# Patient Record
Sex: FEMALE | Race: BLACK OR AFRICAN AMERICAN | NOT HISPANIC OR LATINO | Employment: FULL TIME | URBAN - METROPOLITAN AREA
[De-identification: names, ages, dates, MRNs, and addresses within clinical notes are randomized per-mention and may not be internally consistent; named-entity substitution may affect disease eponyms.]

---
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
## 2023-10-16 ENCOUNTER — HOSPITAL ENCOUNTER (EMERGENCY)
Facility: CLINIC | Age: 23
Discharge: HOME | End: 2023-10-17
Attending: EMERGENCY MEDICINE | Admitting: EMERGENCY MEDICINE
Payer: COMMERCIAL

## 2023-10-16 VITALS
BODY MASS INDEX: 23.39 KG/M2 | DIASTOLIC BLOOD PRESSURE: 76 MMHG | TEMPERATURE: 98.2 F | SYSTOLIC BLOOD PRESSURE: 132 MMHG | WEIGHT: 154.3 LBS | RESPIRATION RATE: 18 BRPM | OXYGEN SATURATION: 100 % | HEART RATE: 82 BPM | HEIGHT: 68 IN

## 2023-10-16 DIAGNOSIS — R11.2 NAUSEA AND VOMITING, UNSPECIFIED VOMITING TYPE: ICD-10-CM

## 2023-10-16 DIAGNOSIS — R10.9 ABDOMINAL PAIN, UNSPECIFIED ABDOMINAL LOCATION: ICD-10-CM

## 2023-10-16 LAB
ALBUMIN SERPL BCG-MCNC: 4.4 G/DL (ref 3.5–5.2)
ALBUMIN UR-MCNC: 30 MG/DL
ALP SERPL-CCNC: 43 U/L (ref 35–104)
ALT SERPL W P-5'-P-CCNC: 7 U/L (ref 0–50)
ANION GAP SERPL CALCULATED.3IONS-SCNC: 16 MMOL/L (ref 7–15)
APPEARANCE UR: CLEAR
AST SERPL W P-5'-P-CCNC: 23 U/L (ref 0–45)
BACTERIA #/AREA URNS HPF: ABNORMAL /HPF
BASO+EOS+MONOS # BLD AUTO: NORMAL 10*3/UL
BASO+EOS+MONOS NFR BLD AUTO: NORMAL %
BASOPHILS # BLD AUTO: 0 10E3/UL (ref 0–0.2)
BASOPHILS NFR BLD AUTO: 0 %
BILIRUB SERPL-MCNC: 0.8 MG/DL
BILIRUB UR QL STRIP: NEGATIVE
BUN SERPL-MCNC: 9.6 MG/DL (ref 6–20)
CALCIUM SERPL-MCNC: 9.6 MG/DL (ref 8.6–10)
CHLORIDE SERPL-SCNC: 103 MMOL/L (ref 98–107)
COLOR UR AUTO: ABNORMAL
CREAT SERPL-MCNC: 0.61 MG/DL (ref 0.51–0.95)
DEPRECATED HCO3 PLAS-SCNC: 19 MMOL/L (ref 22–29)
EGFRCR SERPLBLD CKD-EPI 2021: >90 ML/MIN/1.73M2
EOSINOPHIL # BLD AUTO: 0 10E3/UL (ref 0–0.7)
EOSINOPHIL NFR BLD AUTO: 0 %
ERYTHROCYTE [DISTWIDTH] IN BLOOD BY AUTOMATED COUNT: 12.7 % (ref 10–15)
GLUCOSE SERPL-MCNC: 92 MG/DL (ref 70–99)
GLUCOSE UR STRIP-MCNC: NEGATIVE MG/DL
HCG UR QL: NEGATIVE
HCT VFR BLD AUTO: 40.1 % (ref 35–47)
HGB BLD-MCNC: 13.7 G/DL (ref 11.7–15.7)
HGB UR QL STRIP: NEGATIVE
IMM GRANULOCYTES # BLD: 0 10E3/UL
IMM GRANULOCYTES NFR BLD: 0 %
KETONES UR STRIP-MCNC: >150 MG/DL
LEUKOCYTE ESTERASE UR QL STRIP: NEGATIVE
LIPASE SERPL-CCNC: 18 U/L (ref 13–60)
LYMPHOCYTES # BLD AUTO: 2.7 10E3/UL (ref 0.8–5.3)
LYMPHOCYTES NFR BLD AUTO: 42 %
MCH RBC QN AUTO: 31.1 PG (ref 26.5–33)
MCHC RBC AUTO-ENTMCNC: 34.2 G/DL (ref 31.5–36.5)
MCV RBC AUTO: 91 FL (ref 78–100)
MONOCYTES # BLD AUTO: 0.4 10E3/UL (ref 0–1.3)
MONOCYTES NFR BLD AUTO: 6 %
MUCOUS THREADS #/AREA URNS LPF: PRESENT /LPF
NEUTROPHILS # BLD AUTO: 3.4 10E3/UL (ref 1.6–8.3)
NEUTROPHILS NFR BLD AUTO: 52 %
NITRATE UR QL: NEGATIVE
NRBC # BLD AUTO: 0 10E3/UL
NRBC BLD AUTO-RTO: 0 /100
PH UR STRIP: 5.5 [PH] (ref 5–7)
PLATELET # BLD AUTO: 233 10E3/UL (ref 150–450)
POTASSIUM SERPL-SCNC: 4.1 MMOL/L (ref 3.4–5.3)
PROT SERPL-MCNC: 7.5 G/DL (ref 6.4–8.3)
RBC # BLD AUTO: 4.41 10E6/UL (ref 3.8–5.2)
RBC URINE: 1 /HPF
SODIUM SERPL-SCNC: 138 MMOL/L (ref 135–145)
SP GR UR STRIP: 1.02 (ref 1–1.03)
SQUAMOUS EPITHELIAL: 2 /HPF
UROBILINOGEN UR STRIP-MCNC: <2 MG/DL
WBC # BLD AUTO: 6.4 10E3/UL (ref 4–11)
WBC URINE: 1 /HPF

## 2023-10-16 PROCEDURE — C9113 INJ PANTOPRAZOLE SODIUM, VIA: HCPCS | Mod: JZ | Performed by: EMERGENCY MEDICINE

## 2023-10-16 PROCEDURE — 81001 URINALYSIS AUTO W/SCOPE: CPT | Performed by: EMERGENCY MEDICINE

## 2023-10-16 PROCEDURE — 83690 ASSAY OF LIPASE: CPT | Performed by: EMERGENCY MEDICINE

## 2023-10-16 PROCEDURE — 36415 COLL VENOUS BLD VENIPUNCTURE: CPT | Performed by: EMERGENCY MEDICINE

## 2023-10-16 PROCEDURE — 96375 TX/PRO/DX INJ NEW DRUG ADDON: CPT

## 2023-10-16 PROCEDURE — 99284 EMERGENCY DEPT VISIT MOD MDM: CPT | Mod: 25

## 2023-10-16 PROCEDURE — 96374 THER/PROPH/DIAG INJ IV PUSH: CPT

## 2023-10-16 PROCEDURE — 96361 HYDRATE IV INFUSION ADD-ON: CPT

## 2023-10-16 PROCEDURE — 81025 URINE PREGNANCY TEST: CPT | Performed by: EMERGENCY MEDICINE

## 2023-10-16 PROCEDURE — 85025 COMPLETE CBC W/AUTO DIFF WBC: CPT | Performed by: EMERGENCY MEDICINE

## 2023-10-16 PROCEDURE — 250N000011 HC RX IP 250 OP 636: Performed by: EMERGENCY MEDICINE

## 2023-10-16 PROCEDURE — 80053 COMPREHEN METABOLIC PANEL: CPT | Performed by: EMERGENCY MEDICINE

## 2023-10-16 RX ORDER — LORAZEPAM 2 MG/ML
1 INJECTION INTRAMUSCULAR ONCE
Status: COMPLETED | OUTPATIENT
Start: 2023-10-17 | End: 2023-10-16

## 2023-10-16 RX ORDER — MINERAL OIL 100 G/100G
1 OIL RECTAL DAILY PRN
Qty: 5 ENEMA | Refills: 0 | Status: SHIPPED | OUTPATIENT
Start: 2023-10-16

## 2023-10-16 RX ORDER — MORPHINE SULFATE 4 MG/ML
4 INJECTION, SOLUTION INTRAMUSCULAR; INTRAVENOUS ONCE
Status: COMPLETED | OUTPATIENT
Start: 2023-10-16 | End: 2023-10-16

## 2023-10-16 RX ORDER — HALOPERIDOL 5 MG/ML
2 INJECTION INTRAMUSCULAR ONCE
Status: COMPLETED | OUTPATIENT
Start: 2023-10-16 | End: 2023-10-16

## 2023-10-16 RX ADMIN — HALOPERIDOL LACTATE 2 MG: 5 INJECTION, SOLUTION INTRAMUSCULAR at 21:21

## 2023-10-16 RX ADMIN — PANTOPRAZOLE SODIUM 40 MG: 40 INJECTION, POWDER, FOR SOLUTION INTRAVENOUS at 21:22

## 2023-10-16 RX ADMIN — DEXTROSE AND SODIUM CHLORIDE 500 ML: 5; 900 INJECTION, SOLUTION INTRAVENOUS at 22:14

## 2023-10-16 RX ADMIN — LORAZEPAM 1 MG: 2 INJECTION INTRAMUSCULAR; INTRAVENOUS at 23:54

## 2023-10-16 RX ADMIN — MORPHINE SULFATE 4 MG: 4 INJECTION, SOLUTION INTRAMUSCULAR; INTRAVENOUS at 21:25

## 2023-10-16 ASSESSMENT — ACTIVITIES OF DAILY LIVING (ADL)
ADLS_ACUITY_SCORE: 35
ADLS_ACUITY_SCORE: 33

## 2023-10-16 NOTE — Clinical Note
Mela Padilla was seen and treated in our emergency department on 10/16/2023.  She may return to work on 10/23/2023.       If you have any questions or concerns, please don't hesitate to call.      Dhaval Granados MD

## 2023-10-17 NOTE — ED NOTES
AIDET performed, white board updated for rounding. Patient updated on plan of care. Patient's pain assessed. Call light within reach, bed in low position, side rails up. Visitor at bedside:

## 2023-10-17 NOTE — DISCHARGE INSTRUCTIONS
You were seen in the emergency department for abdominal pain.  Your lab evaluation is repeated and looks generally stable.  We reviewed your CT imaging from yesterday and did not suggest repeating this based on our evaluation here.  You received some IV fluids including some IV dextrose (sugar) which we think will help you feel better.  You can continue medications prescribed at your other visit including zofran and pepcid.  We did prescribe you the suppository that you were requesting, if you still struggle to have bowel movements at home this is another option besides MiraLAX that you might find helpful.  You can continue using Zofran.  Please try to stick to a very bland diet avoiding spicy foods and fatty foods.  Try to drink small but frequent doses of liquids to stay well-hydrated.  We are hopeful that your symptoms will improve and agree with your plan to follow-up with gastroenterology to continue your outpatient evaluation.

## 2023-10-17 NOTE — ED NOTES
Pt screaming in her room, the writer heard from the lobby. When the writer checked on the PT she stated she screamed because the pain hurt so bad. MD informed.

## 2023-10-17 NOTE — ED PROVIDER NOTES
EMERGENCY DEPARTMENT ENCOUNTER      NAME: Mela Padilla  AGE: 23 year old female  YOB: 2000  MRN: [id number]  EVALUATION DATE & TIME: 10/16/2023  8:36 PM    PCP: No primary care provider on file.    ED PROVIDER: Dhaval Granados M.D.      Chief Complaint   Patient presents with    Abdominal Pain    Constipation         FINAL IMPRESSION:  1. Abdominal pain, unspecified abdominal location    2. Nausea and vomiting, unspecified vomiting type          ED COURSE & MEDICAL DECISION MAKIN:55 PM I met with the patient to obtain patient history and performed a physical exam. Discussed plan for ED work up including potential diagnostic studies and interventions.  11:17 PM I rechecked and updated patient on results and care plan.     23 year old female presents to the Emergency Department for evaluation of abdominal pain vomiting and constipation.  Patient is uncomfortable appearing but vitally stable when she arrives to the emergency department.  I reviewed her records from 2 visits within the last 24 hours at [facility] for abdominal pain.  This included reassuring lab work-up, urine analysis, and ultimately a CT scan.  CT had demonstrated some suggestion of a possible ruptured ovarian cyst, but no other acute intra-abdominal process like appendicitis, obstruction, perforation, etc.  She was diagnosed with possible gastritis and prescribed Zofran and Pepcid which she has not yet picked up.  She returns to the emergency department with continued vomiting and abdominal pain.  She is very anxious and difficult to get history from.  She seems to have tenderness throughout her entire abdomen without any focal peritoneal signs or guarding.  Lab evaluation was repeated here today which looks generally stable including a persistently normal white blood cell count, stable hemoglobin, stable metabolic profile, liver function test and lipase.  She does have an anion gap acidosis which is mild, likely related  to ketoacidosis given ketones in her urine and poor PO intake over the last two days.  She received IV fluid bolus including some IV dextrose  containing fluids to address this. Urine analysis again without any evidence of infection or hematuria.  Patient initially had some good response to Zofran Haldol and morphine, she was asleep on recheck, but basically anytime she would wake up, continue to have reported significant abdominal cramping.  She was initially requesting an enema, then later declined this.  She certainly appears anxious, was given another dose of IV Ativan and again fell asleep.  Additional family to bedside at this time.  We had discussed repeating CT imaging given the persistence of her symptoms to make sure that nothing has evolved compared to yesterday, although I think there would be fairly low likelihood based on stability of her lab studies.  Patient and family were requesting to be released at this point, and patient was discharged in stable condition.      Medical Decision Making    History:  Supplemental history from: Documented in chart, if applicable  External Record(s) reviewed: Outpatient Record: ED 10/15/23, 10/16/23    Work Up:  Chart documentation includes differential considered and any EKGs or imaging independently interpreted by provider, where specified.  In additional to work up documented, I considered the following work up: Documented in chart, if applicable.    External consultation:  Discussion of management with another provider: Documented in chart, if applicable    Complicating factors:  Care impacted by chronic illness: N/A  Care affected by social determinants of health: Access to Medical Care    Disposition considerations: Discharge. I prescribed additional prescription strength medication(s) as charted. See documentation for any additional details.            MEDICATIONS GIVEN IN THE EMERGENCY:  Medications   haloperidol lactate (HALDOL) injection 2 mg (2 mg  Intravenous $Given 10/16/23 2121)   morphine (PF) injection 4 mg (4 mg Intravenous $Given 10/16/23 2125)   pantoprazole (PROTONIX) IV push injection 40 mg (40 mg Intravenous $Given 10/16/23 2122)   dextrose 5% and 0.9% NaCl BOLUS 500 mL (0 mLs Intravenous Stopped 10/16/23 2304)   LORazepam (ATIVAN) injection 1 mg (1 mg Intravenous $Given 10/16/23 2184)       NEW PRESCRIPTIONS STARTED AT TODAY'S ER VISIT  Discharge Medication List as of 10/17/2023 12:41 AM        START taking these medications    Details   mineral oil enema Place 1 enema rectally daily as needed for constipation, Disp-5 enema, R-0, Local Print                =================================================================    HPI    Patient information was obtained from: patient    Use of : N/A        Mela BEDOLLA Randy is a 23 year old female with no pertinent history who presents to this ED via walk-in with family for evaluation of abdominal pain.    Per chart review, patient has been seen at [facility] ED on 10/15/2023 and 10/16/2023 for abdominal pain and constipation. Her labs were unremarkable. CT abdomen pelvis showed small slightly crenulated-appearing right ovarian cyst adn trace pelvice ascites, findings which may represent ovarian follicle as the source of the patient's abdominopelvic pain. She was discharged home with Zofran and hydrocodone for pain relief.    Patient reports persistent constant LLQ abdominal pain that radiates around to her lower back and up into her chest for the past 3 days. She also associates constipation (last BM was 9 days ago), nausea, vomiting, decrease in sleep, and shortness of breath secondary to the pain. She says she has been unable to keep anything down. She tried taking Miralax x2 and OTC laxatives today with no relief.  Due to the persistent symptoms, she called [facility] today who advised her to go to the ED for pain control and a possible repeat CT scan.     She otherwise denies associating hematuria,  "vaginal discharge, and vaginal bleeding. She denies history of abdominal surgeries. She is currently coming off of her menstrual period. There were no other concerns/complaints at this time.       REVIEW OF SYSTEMS   All systems reviewed and negative except as noted in HPI.    PAST MEDICAL HISTORY:  History reviewed. No pertinent past medical history.    PAST SURGICAL HISTORY:  History reviewed. No pertinent surgical history.        CURRENT MEDICATIONS:    No current facility-administered medications for this encounter.     Current Outpatient Medications   Medication    mineral oil enema         ALLERGIES:  No Known Allergies    FAMILY HISTORY:  History reviewed. No pertinent family history.    SOCIAL HISTORY:   Social History     Socioeconomic History    Marital status: Single       VITALS:  /76   Pulse 82   Temp 98.2  F (36.8  C) (Oral)   Resp 18   Ht 1.715 m (5' 7.5\")   Wt 70 kg (154 lb 4.8 oz)   SpO2 100%   BMI 23.81 kg/m      PHYSICAL EXAM    Constitutional: Well-developed young female patient, sitting up in bed, anxious appearing but nontoxic  HENT: Normocephalic, Atraumatic. Neck Supple.  Eyes: EOMI, Conjunctiva normal.  Respiratory: Breathing comfortably on room air. Speaks full sentences easily. Lungs clear to ascultation.  Cardiovascular: Normal heart rate, Regular rhythm. No peripheral edema.  Abdomen: Soft, moderate tenderness throughout entire abdomen, no focal peritoneal signs or guarding.  Musculoskeletal: Good range of motion in all major joints. No major deformities noted.  Integument: Warm, Dry.  Neurologic: Alert & awake, Normal motor function, Normal sensory function, No focal deficits noted.   Psychiatric: Cooperative. Affect appropriate.     LAB:  All pertinent labs reviewed and interpreted.  Labs Ordered and Resulted from Time of ED Arrival to Time of ED Departure   COMPREHENSIVE METABOLIC PANEL - Abnormal       Result Value    Sodium 138      Potassium 4.1      Carbon Dioxide " (CO2) 19 (*)     Anion Gap 16 (*)     Urea Nitrogen 9.6      Creatinine 0.61      GFR Estimate >90      Calcium 9.6      Chloride 103      Glucose 92      Alkaline Phosphatase 43      AST 23      ALT 7      Protein Total 7.5      Albumin 4.4      Bilirubin Total 0.8     ROUTINE UA WITH MICROSCOPIC REFLEX TO CULTURE - Abnormal    Color Urine Light Yellow      Appearance Urine Clear      Glucose Urine Negative      Bilirubin Urine Negative      Ketones Urine >150 (*)     Specific Gravity Urine 1.024      Blood Urine Negative      pH Urine 5.5      Protein Albumin Urine 30 (*)     Urobilinogen Urine <2.0      Nitrite Urine Negative      Leukocyte Esterase Urine Negative      Bacteria Urine Few (*)     Mucus Urine Present (*)     RBC Urine 1      WBC Urine 1      Squamous Epithelials Urine 2 (*)    LIPASE - Normal    Lipase 18     HCG QUALITATIVE URINE - Normal    hCG Urine Qualitative Negative     CBC WITH PLATELETS AND DIFFERENTIAL    WBC Count 6.4      RBC Count 4.41      Hemoglobin 13.7      Hematocrit 40.1      MCV 91      MCH 31.1      MCHC 34.2      RDW 12.7      Platelet Count 233      % Neutrophils 52      % Lymphocytes 42      % Monocytes 6      Mids % (Monos, Eos, Basos)        % Eosinophils 0      % Basophils 0      % Immature Granulocytes 0      NRBCs per 100 WBC 0      Absolute Neutrophils 3.4      Absolute Lymphocytes 2.7      Absolute Monocytes 0.4      Mids Abs (Monos, Eos, Basos)        Absolute Eosinophils 0.0      Absolute Basophils 0.0      Absolute Immature Granulocytes 0.0      Absolute NRBCs 0.0               I, Alison Fall, am serving as a scribe to document services personally performed by Dr. Dhaval Granados, based on my observation and the provider's statements to me. I, Dhaval Granados MD attest that Alison Fall is acting in a scribe capacity, has observed my performance of the services and has documented them in accordance with my direction.    Dhaval Granados M.D.  Emergency Medicine  [facility]  [facility] EMERGENCY ROOM  [address] [address]  [phone]  Dept: [phone]       Dhaval Granados MD  10/17/23 0216

## 2023-10-17 NOTE — ED TRIAGE NOTES
Pt is coming in tonight after having 9 day of ABD pain. Pt was seen at [facility] yesterday for vomiting up blood. [place] states that Pt could have gastritis or peptic ulcer disease. Pt states that she has not been able to have BM for about 9 days. PT took two doses of merlex today and had a very small amount of BM. Pt states that [facility] did not see a bowl obstructions but that PT had a lot of stool. Pt is having 10/10 pain in triage.      Triage Assessment (Adult)       Row Name 10/16/23 2026          Triage Assessment    Airway WDL WDL        Respiratory WDL    Respiratory WDL WDL        Skin Circulation/Temperature WDL    Skin Circulation/Temperature WDL WDL        Cardiac WDL    Cardiac WDL WDL        Peripheral/Neurovascular WDL    Peripheral Neurovascular WDL WDL        Cognitive/Neuro/Behavioral WDL    Cognitive/Neuro/Behavioral WDL WDL

## 2023-10-17 NOTE — SIGNIFICANT EVENT
Pt screaming in pain, crying and even stepping out her room stating she cannot take it anymore, pt walked to bathroom and had a bowel movement on the floor, stated she couldn't make it to the toilet, provide notified and arrived to bedside, discharge canceled for further eval

## 2023-10-17 NOTE — DISCHARGE SUMMARY
Discharge education completed with pt and pt mother and sister at bedside, IV removed, pt stated she is feeling a bit better, pt able to tolerate some oral fluids well, printed script handed to pt, printed doctors note to excuse pt from work also handed to pt, discharged, mother is her ride home

## 2023-10-18 ENCOUNTER — LAB REQUISITION (OUTPATIENT)
Dept: LAB | Facility: CLINIC | Age: 23
End: 2023-10-18
Payer: COMMERCIAL

## 2023-10-18 DIAGNOSIS — R10.32 LEFT LOWER QUADRANT PAIN: ICD-10-CM

## 2023-10-18 PROCEDURE — 84439 ASSAY OF FREE THYROXINE: CPT | Mod: ORL | Performed by: PEDIATRICS

## 2023-10-18 PROCEDURE — 86140 C-REACTIVE PROTEIN: CPT | Mod: ORL | Performed by: PEDIATRICS

## 2023-10-18 PROCEDURE — 84443 ASSAY THYROID STIM HORMONE: CPT | Mod: ORL | Performed by: PEDIATRICS

## 2023-10-19 LAB
CRP SERPL-MCNC: <3 MG/L
T4 FREE SERPL-MCNC: 1.64 NG/DL (ref 0.9–1.7)
TSH SERPL DL<=0.005 MIU/L-ACNC: 0.6 UIU/ML (ref 0.3–4.2)

## 2024-01-15 ENCOUNTER — LAB REQUISITION (OUTPATIENT)
Dept: LAB | Facility: CLINIC | Age: 24
End: 2024-01-15
Payer: COMMERCIAL

## 2024-01-15 DIAGNOSIS — R30.0 DYSURIA: ICD-10-CM

## 2024-01-15 PROCEDURE — 87086 URINE CULTURE/COLONY COUNT: CPT | Mod: ORL | Performed by: PEDIATRICS

## 2024-01-15 PROCEDURE — 87491 CHLMYD TRACH DNA AMP PROBE: CPT | Mod: ORL | Performed by: PEDIATRICS

## 2024-01-16 LAB
C TRACH DNA SPEC QL PROBE+SIG AMP: NEGATIVE
N GONORRHOEA DNA SPEC QL NAA+PROBE: NEGATIVE

## 2024-01-17 LAB — BACTERIA UR CULT: NORMAL

## 2024-11-16 ENCOUNTER — HOSPITAL ENCOUNTER (EMERGENCY)
Facility: HOSPITAL | Age: 24
Discharge: HOME | End: 2024-11-16
Attending: EMERGENCY MEDICINE | Admitting: EMERGENCY MEDICINE

## 2024-11-16 ENCOUNTER — APPOINTMENT (OUTPATIENT)
Dept: CT IMAGING | Facility: HOSPITAL | Age: 24
End: 2024-11-16
Attending: EMERGENCY MEDICINE

## 2024-11-16 VITALS
TEMPERATURE: 98.1 F | RESPIRATION RATE: 19 BRPM | HEIGHT: 68 IN | OXYGEN SATURATION: 100 % | WEIGHT: 153 LBS | BODY MASS INDEX: 23.19 KG/M2 | HEART RATE: 74 BPM | SYSTOLIC BLOOD PRESSURE: 114 MMHG | DIASTOLIC BLOOD PRESSURE: 77 MMHG

## 2024-11-16 DIAGNOSIS — R10.9 ABDOMINAL PAIN, UNSPECIFIED ABDOMINAL LOCATION: ICD-10-CM

## 2024-11-16 DIAGNOSIS — R11.2 NAUSEA AND VOMITING, UNSPECIFIED VOMITING TYPE: ICD-10-CM

## 2024-11-16 LAB
ALBUMIN SERPL BCG-MCNC: 5 G/DL (ref 3.5–5.2)
ALP SERPL-CCNC: 57 U/L (ref 40–150)
ALT SERPL W P-5'-P-CCNC: 16 U/L (ref 0–50)
ANION GAP SERPL CALCULATED.3IONS-SCNC: 20 MMOL/L (ref 7–15)
AST SERPL W P-5'-P-CCNC: 32 U/L (ref 0–45)
BASOPHILS # BLD AUTO: 0 10E3/UL (ref 0–0.2)
BASOPHILS NFR BLD AUTO: 0 %
BILIRUB DIRECT SERPL-MCNC: <0.2 MG/DL (ref 0–0.3)
BILIRUB SERPL-MCNC: 0.8 MG/DL
BUN SERPL-MCNC: 11.2 MG/DL (ref 6–20)
CALCIUM SERPL-MCNC: 10 MG/DL (ref 8.8–10.4)
CHLORIDE SERPL-SCNC: 101 MMOL/L (ref 98–107)
CREAT SERPL-MCNC: 0.65 MG/DL (ref 0.51–0.95)
EGFRCR SERPLBLD CKD-EPI 2021: >90 ML/MIN/1.73M2
EOSINOPHIL # BLD AUTO: 0 10E3/UL (ref 0–0.7)
EOSINOPHIL NFR BLD AUTO: 0 %
ERYTHROCYTE [DISTWIDTH] IN BLOOD BY AUTOMATED COUNT: 12.1 % (ref 10–15)
GLUCOSE BLDC GLUCOMTR-MCNC: 81 MG/DL (ref 70–99)
GLUCOSE SERPL-MCNC: 84 MG/DL (ref 70–99)
HCG SERPL QL: NEGATIVE
HCO3 SERPL-SCNC: 15 MMOL/L (ref 22–29)
HCT VFR BLD AUTO: 45.6 % (ref 35–47)
HGB BLD-MCNC: 15.4 G/DL (ref 11.7–15.7)
HOLD SPECIMEN: NORMAL
IMM GRANULOCYTES # BLD: 0 10E3/UL
IMM GRANULOCYTES NFR BLD: 1 %
LYMPHOCYTES # BLD AUTO: 1.1 10E3/UL (ref 0.8–5.3)
LYMPHOCYTES NFR BLD AUTO: 13 %
MAGNESIUM SERPL-MCNC: 2 MG/DL (ref 1.7–2.3)
MCH RBC QN AUTO: 31.1 PG (ref 26.5–33)
MCHC RBC AUTO-ENTMCNC: 33.8 G/DL (ref 31.5–36.5)
MCV RBC AUTO: 92 FL (ref 78–100)
MONOCYTES # BLD AUTO: 0.5 10E3/UL (ref 0–1.3)
MONOCYTES NFR BLD AUTO: 6 %
NEUTROPHILS # BLD AUTO: 6.9 10E3/UL (ref 1.6–8.3)
NEUTROPHILS NFR BLD AUTO: 81 %
NRBC # BLD AUTO: 0 10E3/UL
NRBC BLD AUTO-RTO: 0 /100
PLATELET # BLD AUTO: 268 10E3/UL (ref 150–450)
POTASSIUM SERPL-SCNC: 5.2 MMOL/L (ref 3.4–5.3)
PROT SERPL-MCNC: 8.9 G/DL (ref 6.4–8.3)
RBC # BLD AUTO: 4.95 10E6/UL (ref 3.8–5.2)
SODIUM SERPL-SCNC: 136 MMOL/L (ref 135–145)
WBC # BLD AUTO: 8.6 10E3/UL (ref 4–11)

## 2024-11-16 PROCEDURE — 85025 COMPLETE CBC W/AUTO DIFF WBC: CPT | Performed by: EMERGENCY MEDICINE

## 2024-11-16 PROCEDURE — 250N000011 HC RX IP 250 OP 636: Performed by: EMERGENCY MEDICINE

## 2024-11-16 PROCEDURE — 99285 EMERGENCY DEPT VISIT HI MDM: CPT | Mod: 25

## 2024-11-16 PROCEDURE — 82040 ASSAY OF SERUM ALBUMIN: CPT | Performed by: EMERGENCY MEDICINE

## 2024-11-16 PROCEDURE — 82248 BILIRUBIN DIRECT: CPT | Performed by: EMERGENCY MEDICINE

## 2024-11-16 PROCEDURE — 82310 ASSAY OF CALCIUM: CPT | Performed by: EMERGENCY MEDICINE

## 2024-11-16 PROCEDURE — 36415 COLL VENOUS BLD VENIPUNCTURE: CPT | Performed by: EMERGENCY MEDICINE

## 2024-11-16 PROCEDURE — 96375 TX/PRO/DX INJ NEW DRUG ADDON: CPT

## 2024-11-16 PROCEDURE — 258N000003 HC RX IP 258 OP 636: Performed by: EMERGENCY MEDICINE

## 2024-11-16 PROCEDURE — 84703 CHORIONIC GONADOTROPIN ASSAY: CPT | Performed by: EMERGENCY MEDICINE

## 2024-11-16 PROCEDURE — 93005 ELECTROCARDIOGRAM TRACING: CPT | Performed by: EMERGENCY MEDICINE

## 2024-11-16 PROCEDURE — 96374 THER/PROPH/DIAG INJ IV PUSH: CPT | Mod: 59

## 2024-11-16 PROCEDURE — 74177 CT ABD & PELVIS W/CONTRAST: CPT

## 2024-11-16 PROCEDURE — 82962 GLUCOSE BLOOD TEST: CPT

## 2024-11-16 PROCEDURE — 80076 HEPATIC FUNCTION PANEL: CPT | Performed by: EMERGENCY MEDICINE

## 2024-11-16 PROCEDURE — 250N000009 HC RX 250: Performed by: EMERGENCY MEDICINE

## 2024-11-16 PROCEDURE — 83735 ASSAY OF MAGNESIUM: CPT | Performed by: EMERGENCY MEDICINE

## 2024-11-16 PROCEDURE — 250N000013 HC RX MED GY IP 250 OP 250 PS 637: Performed by: EMERGENCY MEDICINE

## 2024-11-16 RX ORDER — PROMETHAZINE HYDROCHLORIDE 25 MG/1
25 TABLET ORAL EVERY 6 HOURS PRN
Qty: 30 TABLET | Refills: 0 | Status: SHIPPED | OUTPATIENT
Start: 2024-11-16

## 2024-11-16 RX ORDER — IOPAMIDOL 755 MG/ML
75 INJECTION, SOLUTION INTRAVASCULAR ONCE
Status: COMPLETED | OUTPATIENT
Start: 2024-11-16 | End: 2024-11-16

## 2024-11-16 RX ORDER — MAGNESIUM HYDROXIDE/ALUMINUM HYDROXICE/SIMETHICONE 120; 1200; 1200 MG/30ML; MG/30ML; MG/30ML
15 SUSPENSION ORAL ONCE
Status: COMPLETED | OUTPATIENT
Start: 2024-11-16 | End: 2024-11-16

## 2024-11-16 RX ORDER — ONDANSETRON 2 MG/ML
4 INJECTION INTRAMUSCULAR; INTRAVENOUS ONCE
Status: COMPLETED | OUTPATIENT
Start: 2024-11-16 | End: 2024-11-16

## 2024-11-16 RX ORDER — ONDANSETRON 4 MG/1
4 TABLET, ORALLY DISINTEGRATING ORAL EVERY 8 HOURS PRN
Qty: 10 TABLET | Refills: 0 | Status: SHIPPED | OUTPATIENT
Start: 2024-11-16 | End: 2024-11-19

## 2024-11-16 RX ADMIN — ONDANSETRON 4 MG: 2 INJECTION INTRAMUSCULAR; INTRAVENOUS at 19:04

## 2024-11-16 RX ADMIN — ALUMINUM HYDROXIDE, MAGNESIUM HYDROXIDE, AND SIMETHICONE 15 ML: 200; 200; 20 SUSPENSION ORAL at 20:29

## 2024-11-16 RX ADMIN — IOPAMIDOL 75 ML: 755 INJECTION, SOLUTION INTRAVENOUS at 21:48

## 2024-11-16 RX ADMIN — PROMETHAZINE HYDROCHLORIDE 25 MG: 25 INJECTION INTRAMUSCULAR; INTRAVENOUS at 20:28

## 2024-11-16 RX ADMIN — FAMOTIDINE 20 MG: 10 INJECTION, SOLUTION INTRAVENOUS at 20:28

## 2024-11-16 RX ADMIN — PANTOPRAZOLE SODIUM 40 MG: 40 INJECTION, POWDER, FOR SOLUTION INTRAVENOUS at 20:29

## 2024-11-16 ASSESSMENT — ACTIVITIES OF DAILY LIVING (ADL)
ADLS_ACUITY_SCORE: 0

## 2024-11-16 ASSESSMENT — COLUMBIA-SUICIDE SEVERITY RATING SCALE - C-SSRS
2. HAVE YOU ACTUALLY HAD ANY THOUGHTS OF KILLING YOURSELF IN THE PAST MONTH?: NO
6. HAVE YOU EVER DONE ANYTHING, STARTED TO DO ANYTHING, OR PREPARED TO DO ANYTHING TO END YOUR LIFE?: NO
1. IN THE PAST MONTH, HAVE YOU WISHED YOU WERE DEAD OR WISHED YOU COULD GO TO SLEEP AND NOT WAKE UP?: NO

## 2024-11-16 NOTE — Clinical Note
Mela Padilla was seen and treated in our emergency department on 11/16/2024.  She may return to work on 12/02/2024.       If you have any questions or concerns, please don't hesitate to call.      Rachna Nolasco MD

## 2024-11-17 NOTE — ED TRIAGE NOTES
Patient began to have abdominal pain on Thursday. Patient attempted to eat but began vomiting. Since then patient's had increased abdominal pain, vomiting, diarrhea and shortness of breath. Patient has not been able to sleep since Thursday. Patient states stool is dark brown with green mucus. Patient brought to room, stat EKG ordered.     Triage Assessment (Adult)       Row Name 11/16/24 1823          Triage Assessment    Airway WDL WDL        Respiratory WDL    Respiratory WDL WDL        Skin Circulation/Temperature WDL    Skin Circulation/Temperature WDL WDL        Cardiac WDL    Cardiac WDL WDL        Peripheral/Neurovascular WDL    Peripheral Neurovascular WDL WDL        Cognitive/Neuro/Behavioral WDL    Cognitive/Neuro/Behavioral WDL WDL        Jose J Coma Scale    Best Eye Response 4-->(E4) spontaneous     Best Motor Response 6-->(M6) obeys commands     Best Verbal Response 5-->(V5) oriented     Jose J Coma Scale Score 15

## 2024-11-17 NOTE — DISCHARGE INSTRUCTIONS
You were seen in the Emergency Department today for evaluation of nausea and vomiting and abdominal pain.  Your lab work showed no cause of your symptoms. Your imaging studies showed no significant cause of your symptoms.  There was a little bit of free fluid on the left side that could have been from a ruptured ovarian cyst.  You were prescribed Zofran and Phenergan for nausea. You should take all medications as prescribed.  Follow up with your primary care physician to ensure resolution of symptoms. Return if you have new or worsening symptoms.

## 2024-11-17 NOTE — ED PROVIDER NOTES
EMERGENCY DEPARTMENT ENCOUNTER      NAME: Mela Padilla  AGE: 24 year old female  YOB: 2000  MRN: [id number]  EVALUATION DATE & TIME: 11/16/2024  6:17 PM    PCP: [facility]    ED PROVIDER: Rachna Nolasco M.D.      Chief Complaint   Patient presents with    Nausea, Vomiting, & Diarrhea    Shortness of Breath         FINAL IMPRESSION:  1. Nausea and vomiting, unspecified vomiting type    2. Abdominal pain, unspecified abdominal location        ED COURSE & MEDICAL DECISION MAKING:    Pertinent Labs & Imaging studies reviewed. (See chart for details)  ED Course as of 11/16/24 2226   Sat Nov 16, 2024 1938 Patient is a pleasant 24-year-old female who comes in today for evaluation of nausea and vomiting and just not feeling well.  She says she had a lot of depression and anxiety last year and had previously been on insomnia meds and antianxiety meds but it made her unable to work.  Somebody had suggested she start using marijuana so she started using that earlier in the year and is now weaning down.  She has had some nausea and vomiting episodes at times and some left-sided abdominal pain episodes at times.  This was occurring even before she started the marijuana.  She not able to eat or drink much of anything and is concerned for dehydration.  She tried a brat diet at home without success.  She started having some diarrhea.  She tried to begin yesterday to see if she could eat and was still having vomiting.  She tried Emetrol without relief.  She has low bit of a sore throat and just describes as burning pain in her abdomen.  When I look at the note from Dr. Granados from a year ago she had the same symptoms at that time 2.  I do not see a more recent CT scan so we will rescan her today and see what her labs look like.  The BMP is back and does show an anion gap acidosis which I suspect is related to all the vomiting.  I will treat her with some Phenergan as well as some Pepcid and GI cocktail  and we will see if we get her feeling better and then we can reevaluate.  I discussed all that with her and she is in agreement with the plan.   2018 Patient is asking for something to drink.  We can give her some ice chips for now.   2205 Patient does have a small left ovarian follicle with little free fluid so she could have had an ovarian cyst.  There is nothing that looks concerning that needs further workup at this time.  I will check back in with her and see how she is feeling.   2208 Check back in on the patient.  She is doing quite a bit better.  She looks comfortable.  She is not tachypneic anymore.  She has some fluids.  I suspect that her acidosis has improved somewhat and I think she can manage it at home.  I will send her with prescription for Zofran and Phenergan.  She is in agreement with the plan.  We will get her dismissed.       Medical Decision Making    History:  Supplemental history from: None  External Record(s) reviewed: I reviewed the note from [facility] emergency department on 4/2/2024.  Patient had presented for vomiting and 1 week of abdominal pain.  She described as a stabbing pain.  Patient was treated with Toradol, Pepcid, Zofran, and some IV fluids.  Lab work came back showing an anion gap acidosis.  Ultimately patient felt better and was discharged home.    Work Up:  Emergent/Severe conditions considered and evaluated for: Electrolyte abnormality  I independently reviewed and interpreted EKG and CT abdomen pelvis which showed no perforation. See full radiology report for all details. Rhythm strip shows normal sinus rhythm at 84 bpm.  In additional to work up documented, I considered the following work up: None  Medications given that require intensive monitoring for toxicity: None    External consultation:  Discussion of management with another provider: None    Complicating factors:  Care impacted by chronic illness: Anxiety, depression    Disposition considerations: Considered  admission but patient had good improvement in her symptoms and vomiting was better controlled so I do not think she needs management in the hospital.  Prescriptions considered/prescribed: Phenergan, zofran    Not Applicable    At the conclusion of the encounter I discussed  the results of all of the tests and the disposition with patient.   All questions were answered.  The patient acknowledged understanding and was involved in the decision making regarding the overall care plan.      I discussed with patient the utility, limitations and findings of the exam/interventions/studies done during this visit as well as the list of differential diagnosis and symptoms to monitor/return to ER for.  Additional verbal discharge instructions were provided.     MEDICATIONS GIVEN IN THE EMERGENCY:  Medications   ondansetron (ZOFRAN) injection 4 mg (4 mg Intravenous $Given 11/16/24 1904)   promethazine (PHENERGAN) 25 mg in sodium chloride 0.9 % 55 mL intermittent infusion (25 mg Intravenous $Given 11/16/24 2028)   famotidine (PEPCID) injection 20 mg (20 mg Intravenous $Given 11/16/24 2028)   alum & mag hydroxide-simethicone (MAALOX) suspension 15 mL (15 mLs Oral $Given 11/16/24 2029)   pantoprazole (PROTONIX) IV push injection 40 mg (40 mg Intravenous $Given 11/16/24 2029)   iopamidol (ISOVUE-370) solution 75 mL (75 mLs Intravenous $Given 11/16/24 2148)       NEW PRESCRIPTIONS STARTED AT TODAY'S ER VISIT  New Prescriptions    ONDANSETRON (ZOFRAN ODT) 4 MG ODT TAB    Take 1 tablet (4 mg) by mouth every 8 hours as needed for nausea.    PROMETHAZINE (PHENERGAN) 25 MG TABLET    Take 1 tablet (25 mg) by mouth every 6 hours as needed for nausea.          =================================================================    HPI    Triage Note: Patient began to have abdominal pain on Thursday. Patient attempted to eat but began vomiting. Since then patient's had increased abdominal pain, vomiting, diarrhea and shortness of breath. Patient  "has not been able to sleep since Thursday. Patient states stool is dark brown with green mucus. Patient brought to room, stat EKG ordered.     Triage Assessment (Adult)       Row Name 11/16/24 1823          Triage Assessment    Airway WDL WDL        Respiratory WDL    Respiratory WDL WDL        Skin Circulation/Temperature WDL    Skin Circulation/Temperature WDL WDL        Cardiac WDL    Cardiac WDL WDL        Peripheral/Neurovascular WDL    Peripheral Neurovascular WDL WDL        Cognitive/Neuro/Behavioral WDL    Cognitive/Neuro/Behavioral WDL WDL        Jose J Coma Scale    Best Eye Response 4-->(E4) spontaneous     Best Motor Response 6-->(M6) obeys commands     Best Verbal Response 5-->(V5) oriented     Jose J Coma Scale Score 15                   Use of : None      Amal H Randy is a 24 year old female who presents for evaluation of nausea and vomiting in the setting of marijuana use with similar episodes in the past.    PAST MEDICAL HISTORY:  No past medical history on file.    PAST SURGICAL HISTORY:  No past surgical history on file.    CURRENT MEDICATIONS:    No current facility-administered medications for this encounter.    Current Outpatient Medications:     mineral oil enema, Place 1 enema rectally daily as needed for constipation, Disp: 5 enema, Rfl: 0    ondansetron (ZOFRAN ODT) 4 MG ODT tab, Take 1 tablet (4 mg) by mouth every 8 hours as needed for nausea., Disp: 10 tablet, Rfl: 0    promethazine (PHENERGAN) 25 MG tablet, Take 1 tablet (25 mg) by mouth every 6 hours as needed for nausea., Disp: 30 tablet, Rfl: 0    ALLERGIES:  No Known Allergies    FAMILY HISTORY:  No family history on file.    SOCIAL HISTORY:   Social History     Socioeconomic History    Marital status: Single       PHYSICAL EXAM    VITAL SIGNS: /77   Pulse 74   Temp 98.1  F (36.7  C)   Resp 19   Ht 1.727 m (5' 8\")   Wt 69.4 kg (153 lb)   LMP 10/31/2024 (Approximate)   SpO2 100%   BMI 23.26 kg/m     GENERAL: " Awake, alert, answering questions appropriately, anxious, mildly tachypneic  SPEECH:  Easy to understand speech, Normal volume and deandra  PULMONARY: Mildly tachypneic, Lungs clear to auscultation bilaterally  CARDIOVASCULAR: Regular rate and rhythm, Distal pulses present and normal.  ABDOMINAL: Soft, Nondistended, Nontender, No rebound or guarding, No palpable masses  EXTREMITIES: No lower extremity edema.  PSYCH: Anxious     LAB:  All pertinent labs reviewed and interpreted.  Results for orders placed or performed during the hospital encounter of 11/16/24   CT Abdomen Pelvis w Contrast    Impression    IMPRESSION:   1.  Small follicle left ovary and trace free pelvic fluid.  2.  Otherwise negative exam. No other findings to account for the patient's abdominal pain.   Extra Blue Top Tube   Result Value Ref Range    Hold Specimen JIC    Extra Red Top Tube   Result Value Ref Range    Hold Specimen JIC    Extra Green Top (Lithium Heparin) Tube   Result Value Ref Range    Hold Specimen JIC    Extra Purple Top Tube   Result Value Ref Range    Hold Specimen JIC    Glucose by meter   Result Value Ref Range    GLUCOSE BY METER POCT 81 70 - 99 mg/dL   Basic metabolic panel   Result Value Ref Range    Sodium 136 135 - 145 mmol/L    Potassium 5.2 3.4 - 5.3 mmol/L    Chloride 101 98 - 107 mmol/L    Carbon Dioxide (CO2) 15 (L) 22 - 29 mmol/L    Anion Gap 20 (H) 7 - 15 mmol/L    Urea Nitrogen 11.2 6.0 - 20.0 mg/dL    Creatinine 0.65 0.51 - 0.95 mg/dL    GFR Estimate >90 >60 mL/min/1.73m2    Calcium 10.0 8.8 - 10.4 mg/dL    Glucose 84 70 - 99 mg/dL   Hepatic function panel   Result Value Ref Range    Protein Total 8.9 (H) 6.4 - 8.3 g/dL    Albumin 5.0 3.5 - 5.2 g/dL    Bilirubin Total 0.8 <=1.2 mg/dL    Alkaline Phosphatase 57 40 - 150 U/L    AST 32 0 - 45 U/L    ALT 16 0 - 50 U/L    Bilirubin Direct <0.20 0.00 - 0.30 mg/dL   Result Value Ref Range    Magnesium 2.0 1.7 - 2.3 mg/dL   HCG qualitative Blood   Result Value Ref Range     hCG Serum Qualitative Negative Negative   CBC with platelets and differential   Result Value Ref Range    WBC Count 8.6 4.0 - 11.0 10e3/uL    RBC Count 4.95 3.80 - 5.20 10e6/uL    Hemoglobin 15.4 11.7 - 15.7 g/dL    Hematocrit 45.6 35.0 - 47.0 %    MCV 92 78 - 100 fL    MCH 31.1 26.5 - 33.0 pg    MCHC 33.8 31.5 - 36.5 g/dL    RDW 12.1 10.0 - 15.0 %    Platelet Count 268 150 - 450 10e3/uL    % Neutrophils 81 %    % Lymphocytes 13 %    % Monocytes 6 %    % Eosinophils 0 %    % Basophils 0 %    % Immature Granulocytes 1 %    NRBCs per 100 WBC 0 <1 /100    Absolute Neutrophils 6.9 1.6 - 8.3 10e3/uL    Absolute Lymphocytes 1.1 0.8 - 5.3 10e3/uL    Absolute Monocytes 0.5 0.0 - 1.3 10e3/uL    Absolute Eosinophils 0.0 0.0 - 0.7 10e3/uL    Absolute Basophils 0.0 0.0 - 0.2 10e3/uL    Absolute Immature Granulocytes 0.0 <=0.4 10e3/uL    Absolute NRBCs 0.0 10e3/uL       RADIOLOGY:  CT Abdomen Pelvis w Contrast   Final Result   IMPRESSION:    1.  Small follicle left ovary and trace free pelvic fluid.   2.  Otherwise negative exam. No other findings to account for the patient's abdominal pain.          EKG:    Date and time: November 16, 2024 at 1844  Rate: 76 bpm  Rhythm: Sinus rhythm with sinus arrhythmia  WA interval: 154 ms  QRS interval: 74 ms  QT/QTc: 384/432 ms  ST changes or T wave changes: No acute ST or T wave abnormalities  Change from prior ECG: No significant change from prior  I have independently reviewed and interpreted this EKG.     Rachna Nolasco M.D.  Emergency Medicine  [facility] EMERGENCY DEPARTMENT  [address] [address] [phone]  Dept: [phone]       Rachna Nolasco MD  11/16/24 0431

## 2024-11-27 LAB
ATRIAL RATE - MUSE: 76 BPM
DIASTOLIC BLOOD PRESSURE - MUSE: NORMAL MMHG
INTERPRETATION ECG - MUSE: NORMAL
P AXIS - MUSE: 75 DEGREES
PR INTERVAL - MUSE: 154 MS
QRS DURATION - MUSE: 74 MS
QT - MUSE: 384 MS
QTC - MUSE: 432 MS
R AXIS - MUSE: 66 DEGREES
SYSTOLIC BLOOD PRESSURE - MUSE: NORMAL MMHG
T AXIS - MUSE: 53 DEGREES
VENTRICULAR RATE- MUSE: 76 BPM